# Patient Record
Sex: MALE | Race: WHITE | NOT HISPANIC OR LATINO | Employment: OTHER | URBAN - METROPOLITAN AREA
[De-identification: names, ages, dates, MRNs, and addresses within clinical notes are randomized per-mention and may not be internally consistent; named-entity substitution may affect disease eponyms.]

---

## 2022-03-07 ENCOUNTER — TELEPHONE (OUTPATIENT)
Dept: NEUROLOGY | Facility: CLINIC | Age: 82
End: 2022-03-07
Payer: MEDICARE

## 2022-03-07 NOTE — TELEPHONE ENCOUNTER
----- Message from Franny Malhotra sent at 3/7/2022 10:28 AM CST -----  Contact: Pt wife  Type:  Sooner Appoointment Request    Caller is requesting a sooner appointment.  Caller declined first available appointment listed below.  Caller will not accept being placed on the waitlist and is requesting a message be sent to doctor.  Name of Caller:Pt wife  When is the first available appointment?none available  Symptoms:parkinson  Would the patient rather a call back or a response via [facility]? Call back  Best Call Back Number:[phone]  Additional Information: Please call pt wife with appt date and time

## 2022-03-25 ENCOUNTER — TELEPHONE (OUTPATIENT)
Dept: NEUROLOGY | Facility: CLINIC | Age: 82
End: 2022-03-25
Payer: MEDICARE

## 2022-03-25 NOTE — TELEPHONE ENCOUNTER
Called and spoke with patient who stated he wanted to move his scheduled appointment to June. Appointment rescheduled for June. New Appointment letter placed in the mail.

## 2022-03-25 NOTE — TELEPHONE ENCOUNTER
----- Message from Bhavna Cano sent at 3/25/2022  1:02 PM CDT -----  Contact: Patient  Patient requesting call back in regards to rescheduling appointment.      [phone] ([place])

## 2022-06-03 ENCOUNTER — TELEPHONE (OUTPATIENT)
Dept: NEUROLOGY | Facility: CLINIC | Age: 82
End: 2022-06-03
Payer: MEDICARE

## 2022-06-03 NOTE — TELEPHONE ENCOUNTER
Spoke to patient's wife in order to confirm the June 7th appt with Dr. Tariq. Pt's wife asked to cancel at this time due to some other things coming up and they would be unable to attend.